# Patient Record
Sex: MALE | URBAN - METROPOLITAN AREA
[De-identification: names, ages, dates, MRNs, and addresses within clinical notes are randomized per-mention and may not be internally consistent; named-entity substitution may affect disease eponyms.]

---

## 2020-02-26 ENCOUNTER — APPOINTMENT (RX ONLY)
Dept: URBAN - METROPOLITAN AREA CLINIC 126 | Facility: CLINIC | Age: 30
Setting detail: DERMATOLOGY
End: 2020-02-26

## 2020-02-26 DIAGNOSIS — D49.2 NEOPLASM OF UNSPECIFIED BEHAVIOR OF BONE, SOFT TISSUE, AND SKIN: ICD-10-CM

## 2020-02-26 PROCEDURE — ? DIAGNOSIS COMMENT

## 2020-02-26 PROCEDURE — 99202 OFFICE O/P NEW SF 15 MIN: CPT

## 2020-02-26 ASSESSMENT — LOCATION ZONE DERM: LOCATION ZONE: FACE

## 2020-02-26 ASSESSMENT — LOCATION SIMPLE DESCRIPTION DERM: LOCATION SIMPLE: RIGHT CHEEK

## 2020-02-26 ASSESSMENT — LOCATION DETAILED DESCRIPTION DERM: LOCATION DETAILED: RIGHT LATERAL MANDIBULAR CHEEK

## 2020-02-26 NOTE — PROCEDURE: MIPS QUALITY
Quality 130: Documentation Of Current Medications In The Medical Record: Eligible clinician attests to documenting in the medical record the patient is not eligible for a current list of medications being obtained, updated, or reviewed by the eligible clinician
Detail Level: Detailed
Additional Notes: Pt stated not on any medication

## 2020-02-26 NOTE — PROCEDURE: DIAGNOSIS COMMENT
Detail Level: Simple
Comment: Pt had Voluma filler injected into bilateral jawline and cheeks about 1.5 years ago. He had 3 treatments of Hylenex to reverse the filler. Pt is complaining of nodules on the left cheek and right jawline which could not be felt on examination. Suggested to follow up with PCP.

## 2020-02-26 NOTE — HPI: NODULE (SMALL BUMP UNDERNEATH SKIN)
How Severe Is Your Nodule?: mild
Is This A New Presentation, Or A Follow-Up?: Nodule
Additional History: Pt had Voluma filler 1.5 years ago and then had hylenex injections to reverse it.  He is complaining of nodules under the skin

## 2022-06-04 ENCOUNTER — TELEPHONE ENCOUNTER (OUTPATIENT)
Dept: URBAN - METROPOLITAN AREA CLINIC 68 | Facility: CLINIC | Age: 32
End: 2022-06-04

## 2022-06-04 RX ORDER — PREDNISONE 50 MG/1
PREDNISONE(    1 TAB @ 7PM NIGHT BEFORE TEST, 1 TAB @ 2AM NIGHT BEFORE TEST, 1 TAB @ 7AM MORNING OF TEST. ) INACTIVE -HX ENTRY TABLET ORAL
OUTPATIENT
Start: 2009-02-25

## 2022-06-04 RX ORDER — DOCUSATE SODIUM 100 MG/1
COLACE(    1 CAPSULE ORALLY TWICE DAILY. ) INACTIVE -HX ENTRY CAPSULE ORAL
OUTPATIENT
Start: 2009-04-22

## 2022-06-04 RX ORDER — ACETAMINOPHEN 325 MG/1
TYLENOL(    AS NEEDED ) INACTIVE -HX ENTRY TABLET, FILM COATED ORAL AS NEEDED
OUTPATIENT
Start: 2009-04-22

## 2022-06-05 ENCOUNTER — TELEPHONE ENCOUNTER (OUTPATIENT)
Dept: URBAN - METROPOLITAN AREA CLINIC 68 | Facility: CLINIC | Age: 32
End: 2022-06-05

## 2022-06-05 RX ORDER — POLYETHYLENE GLYCOL 3350 17 G/DOSE
MIRALAX(  ORAL  SOMETIMES-PRN ) ACTIVE -HX ENTRY POWDER (GRAM) ORAL
Status: ACTIVE | COMMUNITY
Start: 2009-05-22

## 2022-06-05 RX ORDER — GAUZE BANDAGE 4" X 4"
FISH OIL CONCENTRATE( 1000MG ORAL  DAILY ) ACTIVE -HX ENTRY BANDAGE TOPICAL DAILY
Status: ACTIVE | COMMUNITY
Start: 2012-01-25

## 2022-06-05 RX ORDER — MAGNESIUM CITRATE
SOLUTION, ORAL ORAL
Status: ACTIVE | COMMUNITY
Start: 2009-05-07

## 2022-06-05 RX ORDER — DOCUSATE SODIUM 100 MG/1
CAPSULE ORAL
Status: ACTIVE | COMMUNITY
Start: 2009-05-06

## 2022-06-05 RX ORDER — LORATADINE 10 MG
TABLET,DISINTEGRATING ORAL AS NEEDED
Qty: 1 | Refills: 1 | Status: ACTIVE | COMMUNITY
Start: 2012-01-25

## 2022-06-25 ENCOUNTER — TELEPHONE ENCOUNTER (OUTPATIENT)
Age: 32
End: 2022-06-25

## 2022-06-25 RX ORDER — POLYETHYLENE GLYCOL 3350 17 G
MIRALAX(    TAKE 17 GRAMS IN 8 OZ OF FLUID ORALLY EACH MORNING. ) INACTIVE -HX ENTRY POWDER IN PACKET (EA) ORAL
OUTPATIENT
Start: 2009-03-04

## 2022-06-25 RX ORDER — POLYETHYLENE GLYCOL 3350 17 G
MIRALAX(    TAKE 17 GRAMS IN 8 OZ OF FLUID ORALLY EACH MORNING. ) INACTIVE -HX ENTRY POWDER IN PACKET (EA) ORAL
OUTPATIENT
Start: 2009-02-18

## 2022-06-25 RX ORDER — PREDNISONE 50 MG/1
PREDNISONE(    1 TAB @ 7PM NIGHT BEFORE TEST, 1 TAB @ 2AM NIGHT BEFORE TEST, 1 TAB @ 7AM MORNING OF TEST. ) INACTIVE -HX ENTRY TABLET ORAL
OUTPATIENT
Start: 2009-02-25

## 2022-06-25 RX ORDER — DIPHENHYDRAMINE HYDROCHLORIDE 25 MG/1
BENADRYL(    TAKE 2 ONE HOUR PRIOR TO CT SCAN ) INACTIVE -HX ENTRY TABLET, FILM COATED ORAL
OUTPATIENT
Start: 2009-02-25

## 2022-06-25 RX ORDER — ACETAMINOPHEN 325 MG/1
TYLENOL(    AS NEEDED ) INACTIVE -HX ENTRY TABLET, FILM COATED ORAL AS NEEDED
OUTPATIENT
Start: 2009-04-22

## 2022-06-25 RX ORDER — DOCUSATE SODIUM 100 MG/1
COLACE(    1 CAPSULE ORALLY TWICE DAILY. ) INACTIVE -HX ENTRY CAPSULE ORAL
OUTPATIENT
Start: 2009-04-22

## 2022-06-25 RX ORDER — MULTIVITAMIN/IRON/FOLIC ACID 18MG-0.4MG
CENTRUM(    1 TABLET DAILY ) INACTIVE -HX ENTRY TABLET ORAL
OUTPATIENT
Start: 2009-02-11

## 2022-06-26 ENCOUNTER — TELEPHONE ENCOUNTER (OUTPATIENT)
Age: 32
End: 2022-06-26

## 2022-06-26 RX ORDER — DOCUSATE SODIUM 100 MG/1
CAPSULE ORAL
Status: ACTIVE | COMMUNITY
Start: 2009-05-06

## 2022-06-26 RX ORDER — POLYETHYLENE GLYCOL 3350 17 G
POWDER IN PACKET (EA) ORAL AS NEEDED
Qty: 1 | Refills: 1 | Status: ACTIVE | COMMUNITY
Start: 2012-01-25

## 2022-06-26 RX ORDER — MAGNESIUM CITRATE
SOLUTION, ORAL ORAL
Status: ACTIVE | COMMUNITY
Start: 2009-05-07

## 2022-06-26 RX ORDER — LORATADINE 5 MG
MIRALAX(  ORAL  SOMETIMES-PRN ) ACTIVE -HX ENTRY TABLET,CHEWABLE ORAL
Status: ACTIVE | COMMUNITY
Start: 2009-05-22

## 2022-06-26 RX ORDER — GAUZE BANDAGE 4" X 4"
FISH OIL CONCENTRATE( 1000MG ORAL  DAILY ) ACTIVE -HX ENTRY BANDAGE TOPICAL DAILY
Status: ACTIVE | COMMUNITY
Start: 2012-01-25

## 2022-07-30 ENCOUNTER — TELEPHONE ENCOUNTER (OUTPATIENT)
Age: 32
End: 2022-07-30

## 2022-07-31 ENCOUNTER — TELEPHONE ENCOUNTER (OUTPATIENT)
Age: 32
End: 2022-07-31